# Patient Record
Sex: FEMALE | Race: WHITE | NOT HISPANIC OR LATINO | Employment: FULL TIME | URBAN - METROPOLITAN AREA
[De-identification: names, ages, dates, MRNs, and addresses within clinical notes are randomized per-mention and may not be internally consistent; named-entity substitution may affect disease eponyms.]

---

## 2024-03-26 PROBLEM — E79.0 ELEVATED BLOOD URIC ACID LEVEL: Status: ACTIVE | Noted: 2024-03-26

## 2024-03-26 PROBLEM — I42.8: Status: ACTIVE | Noted: 2020-08-21

## 2024-03-26 PROBLEM — E55.9 VITAMIN D DEFICIENCY: Status: ACTIVE | Noted: 2024-03-26

## 2024-03-26 PROBLEM — R35.0 INCREASED FREQUENCY OF URINATION: Status: ACTIVE | Noted: 2020-08-21

## 2024-03-26 PROBLEM — O20.0 THREATENED MISCARRIAGE (HHS-HCC): Status: ACTIVE | Noted: 2020-08-21

## 2024-03-26 PROBLEM — R00.2 PALPITATIONS: Status: ACTIVE | Noted: 2020-08-21

## 2024-03-26 PROBLEM — I10 HYPERTENSION: Status: ACTIVE | Noted: 2020-08-21

## 2024-03-26 PROBLEM — E78.5 HYPERLIPIDEMIA: Status: ACTIVE | Noted: 2024-03-26

## 2024-03-26 PROBLEM — N92.0 MENORRHAGIA: Status: ACTIVE | Noted: 2024-03-26

## 2024-03-26 PROBLEM — M54.12 RADICULOPATHY, CERVICAL REGION: Status: ACTIVE | Noted: 2020-09-02

## 2024-03-26 PROBLEM — R74.8 ELEVATED LIVER ENZYMES: Status: ACTIVE | Noted: 2024-03-26

## 2024-03-26 PROBLEM — Z86.79 HISTORY OF CARDIAC ARRHYTHMIA: Status: ACTIVE | Noted: 2024-03-26

## 2024-03-26 PROBLEM — D50.8 IRON DEFICIENCY ANEMIA SECONDARY TO INADEQUATE DIETARY IRON INTAKE: Status: ACTIVE | Noted: 2020-08-21

## 2024-03-26 PROBLEM — M79.601 PAIN OF RIGHT UPPER EXTREMITY: Status: ACTIVE | Noted: 2020-09-02

## 2024-03-26 PROBLEM — O30.012: Status: ACTIVE | Noted: 2024-03-26

## 2024-03-26 PROBLEM — L21.9 SEBORRHEIC DERMATITIS: Status: ACTIVE | Noted: 2024-03-26

## 2024-03-26 PROBLEM — M79.643 HAND PAIN: Status: ACTIVE | Noted: 2024-03-26

## 2024-03-26 PROBLEM — O99.019 ANTEPARTUM ANEMIA (HHS-HCC): Status: ACTIVE | Noted: 2020-08-21

## 2024-03-26 PROBLEM — O09.519 ELDERLY PRIMIGRAVIDA (HHS-HCC): Status: ACTIVE | Noted: 2020-08-21

## 2024-03-27 ENCOUNTER — OFFICE VISIT (OUTPATIENT)
Dept: CARDIOLOGY | Facility: CLINIC | Age: 39
End: 2024-03-27
Payer: COMMERCIAL

## 2024-03-27 VITALS
HEIGHT: 60 IN | WEIGHT: 132.4 LBS | BODY MASS INDEX: 26 KG/M2 | OXYGEN SATURATION: 99 % | SYSTOLIC BLOOD PRESSURE: 98 MMHG | DIASTOLIC BLOOD PRESSURE: 72 MMHG | HEART RATE: 72 BPM

## 2024-03-27 DIAGNOSIS — I42.8 ARRHYTHMOGENIC RIGHT VENTRICULAR DYSPLASIA (MULTI): Primary | ICD-10-CM

## 2024-03-27 PROCEDURE — 3074F SYST BP LT 130 MM HG: CPT | Performed by: INTERNAL MEDICINE

## 2024-03-27 PROCEDURE — 99213 OFFICE O/P EST LOW 20 MIN: CPT | Performed by: INTERNAL MEDICINE

## 2024-03-27 PROCEDURE — 1036F TOBACCO NON-USER: CPT | Performed by: INTERNAL MEDICINE

## 2024-03-27 PROCEDURE — 3078F DIAST BP <80 MM HG: CPT | Performed by: INTERNAL MEDICINE

## 2024-03-27 RX ORDER — NORETHINDRONE ACETATE AND ETHINYL ESTRADIOL 1MG-20(21)
1 KIT ORAL DAILY
COMMUNITY
Start: 2024-03-02

## 2024-03-27 ASSESSMENT — ENCOUNTER SYMPTOMS: OCCASIONAL FEELINGS OF UNSTEADINESS: 0

## 2024-03-27 ASSESSMENT — COLUMBIA-SUICIDE SEVERITY RATING SCALE - C-SSRS
1. IN THE PAST MONTH, HAVE YOU WISHED YOU WERE DEAD OR WISHED YOU COULD GO TO SLEEP AND NOT WAKE UP?: NO
2. HAVE YOU ACTUALLY HAD ANY THOUGHTS OF KILLING YOURSELF?: NO
6. HAVE YOU EVER DONE ANYTHING, STARTED TO DO ANYTHING, OR PREPARED TO DO ANYTHING TO END YOUR LIFE?: NO

## 2024-03-27 ASSESSMENT — PATIENT HEALTH QUESTIONNAIRE - PHQ9
2. FEELING DOWN, DEPRESSED OR HOPELESS: SEVERAL DAYS
10. IF YOU CHECKED OFF ANY PROBLEMS, HOW DIFFICULT HAVE THESE PROBLEMS MADE IT FOR YOU TO DO YOUR WORK, TAKE CARE OF THINGS AT HOME, OR GET ALONG WITH OTHER PEOPLE: NOT DIFFICULT AT ALL
1. LITTLE INTEREST OR PLEASURE IN DOING THINGS: SEVERAL DAYS
SUM OF ALL RESPONSES TO PHQ9 QUESTIONS 1 AND 2: 2

## 2024-03-27 ASSESSMENT — PAIN SCALES - GENERAL: PAINLEVEL: 1

## 2024-03-27 NOTE — PROGRESS NOTES
Primary Care Physician: LAVERNE Pineda  Date of Visit: 03/27/2024  2:00 PM EDT  Location of visit: [facility]     Chief Complaint:   Chief Complaint   Patient presents with    Palpitations    Hyperlipidemia    Hypertension    Follow-up     1 year follow up     HPI / Summary:   Amelia Singh is a 38 y.o. female presents for follow-up    ROS    Medical History:   She has a past medical history of Encounter for immunization (06/06/2019), Personal history of other diseases of the circulatory system (12/19/2018), and Personal history of other drug therapy (01/02/2019).  Surgical Hx:   She has a past surgical history that includes Other surgical history (12/19/2018) and Other surgical history (08/29/2019).   Social Hx:   She reports that she has never smoked. She has never used smokeless tobacco. She reports current alcohol use. She reports that she does not use drugs.  Family Hx:   Her family history is not on file.   Allergies:  Allergies   Allergen Reactions    Neomycin Other     Burning from the ear drops    Neomycin-Hydrocortisone Other     Burning from the ear drops     Outpatient Medications:  Current Outpatient Medications   Medication Instructions    Blisovi Fe 1/20, 28, 1 mg-20 mcg (21)/75 mg (7) tablet 1 tablet, oral, Daily     Physical Exam:  Vitals:    03/27/24 1402   BP: 118/80   BP Location: Right arm   Patient Position: Sitting   BP Cuff Size: Adult   Pulse: 74   SpO2: 99%   Weight: 60.1 kg (132 lb 6.4 oz)   Height: 1.524 m (5')     Wt Readings from Last 5 Encounters:   03/27/24 60.1 kg (132 lb 6.4 oz)   07/31/23 57.2 kg (126 lb)   03/22/23 56.5 kg (124 lb 8 oz)   09/08/22 56.7 kg (125 lb)   08/24/22 56.7 kg (125 lb)     Physical Exam  JVP not elevated. Carotid impulses are 2+ without overlying bruit.   Chest exhibits fair to good air movement with completely clear breath sounds.   The cardiac rhythm is regular with no premature beats.   Normal S1 and S2. No gallop, murmur or  rub, or click.   Abdomen is soft and benign without focal tenderness.   With no lower leg edema. The pedal pulses are intact.     Last Labs:  No visits with results within 6 Month(s) from this visit.   Latest known visit with results is:   Legacy Encounter on 07/31/2023   Component Date Value    Pathology Report 07/31/2023                      Value:    Accession #: [id number]     Date of Procedure:  7/31/2023       Pathologist: [facility], Cytology  Date Reported: 8/8/2023  Date Received:  7/31/2023  Submitting Physician: LAURIE SAGE MD                    FINAL CYTOLOGICAL INTERPRETATION        A.  THINPREP PAP CERVICAL:              Specimen adequacy:       SATISFACTORY FOR EVALUATION.       Quality Indicator: Endocervical/transformation zone component is present.              General Categorization:       NEGATIVE FOR INTRAEPITHELIAL LESION OR MALIGNANCY.                            HIGH RISK HPV TEST RESULT:                       HPV GENOTYPE  16                      NEGATIVE       HPV GENOTYPE  18                      NEGATIVE       HPV GENOTYPE  OTHER             NEGATIVE              Reference Range: Negative                  Slide(s) initially screened by a Cytotechnologist at [facility], [address] [place] [id number]    QC review performed a                          t [facility], [address] [place] [address]    Testing for high-risk (HR) type of human papilloma virus (HPV) is performed by  the Roche lorraine HPV Test.  The lorraine HPV Test is a qualitative polymerase chain  reaction that amplifies DNA of HPV16, HPV18 and 12 other high-risk HPV types  (31, 33, 35, 39, 45, 51, 52, 56, 58, 59, 66, and 68) associated with cervical  cancer and its precursor lesions.  A positive result indicates the presence of  HPV DNA due to one or more of the 14 genotypes: 16, 18, 31, 33, 35, 39, 45, 51,  52, 56, 58, 59, 66,  and 68. Negative results indicate HPV DNA concentrations  are undetectable or below the pre-set threshold for detection. False negative  results may be associated with unoptimized sampling. A negative HR HPV result  does not exclude the possibility of future cytologic HSIL or underlying CIN2-3  or cancer.    This test is approved for cervical specimens by  the US Food and Drug  Administration. Results of this test should be                           interpreted in conjunction with  the patient's Pap test results.  Please refer to ASCCP current guidelines for  the use of HPV DNA testing, result interpretation, and patient management.   The performance of this test was verified by the Molecular Diagnostic  Laboratory at [facility]. The lab is  certified under the Clinical Laboratory Amendments of 1988 (CLIA 88) as  qualified to perform high complexity clinical laboratory testing.    This specimen has been analyzed by the [employer] HealthPrep Imaging System ([employer]  an automated imaging and review system, which assists the laboratory in  evaluating cells on ThinPrep Pap tests. Following automated imaging, selected  fields from every slide were reviewed by a cytotechnologist and/or pathologist.      Electronically Signed Out By [facility],  Cytology//LSM/SLD   By the signature on this report, the individual or group listed as making the  Final Interpretation/Diagnosis certifies th                          at they have reviewed this case.  Diagnostic interpretation performed at [facility] [address] [address]. [facility] [address]  Educational Note:  Cervical cytology is a screening procedure primarily for squamous cancers and  precursors and has associated false-negative and false-positive results as  evidenced by published data.  Your patient's test should be interpreted in this  context, together with patient's history and clinical findings.   Regular  sampling and follow-up of unexplained clinical signs and symptoms are  recommended to minimize false negative results.         Clinical History  Date of Last Menstrual Period:     N/A    Other Clinical Conditions:  COTEST HPV(Genotype) except for ASC-H, HSIL, Carcinoma - Include HPV Genotype  testing    Clinical Diagnosis History: Encounter for routine gynecological examination -  (Z01.419)   Source of Specimen  A: THINPREP PAP CERVICAL            [facility]  Department of Pathology   [address] [place] [address]        CONVERTED FINAL DIAGNOSIS 07/31/2023                      Value:A.  THINPREP PAP CERVICAL:              Specimen adequacy:       SATISFACTORY FOR EVALUATION.       Quality Indicator: Endocervical/transformation zone component is present.              General Categorization:       NEGATIVE FOR INTRAEPITHELIAL LESION OR MALIGNANCY.                            HIGH RISK HPV TEST RESULT:                       HPV GENOTYPE  16                      NEGATIVE       HPV GENOTYPE  18                      NEGATIVE       HPV GENOTYPE  OTHER             NEGATIVE              Reference Range: Negative                    CONVERTED CLINICAL DIAGN* 07/31/2023                      Value:Clinical Diagnosis History: Encounter for routine gynecological examination -  (Z01.419)      CONVERTED DIAGNOSIS COMM* 07/31/2023                      Value:Slide(s) initially screened by a Cytotechnologist at [facility], [address] [place] [id number]    QC review performed at [facility], [address] [place] [address]    Testing for high-risk (HR) type of human papilloma virus (HPV) is performed by  the Roche lorraine HPV Test.  The lorraine HPV Test is a qualitative polymerase chain  reaction that amplifies DNA of HPV16, HPV18 and 12 other high-risk HPV types  (31, 33, 35, 39, 45, 51,  52, 56, 58, 59, 66, and 68) associated with cervical  cancer and its precursor lesions.  A positive result indicates the presence of  HPV DNA due to one or more of the 14 genotypes: 16, 18, 31, 33, 35, 39, 45, 51,  52, 56, 58, 59, 66, and 68. Negative results indicate HPV DNA concentrations  are undetectable or below the pre-set threshold for detection. False negative  results may be associated with unoptimized sampling. A negative HR HPV result  does not exclude the possibility of future                           cytologic HSIL or underlying CIN2-3  or cancer.    This test is approved for cervical specimens by  the US Food and Drug  Administration. Results of this test should be interpreted in conjunction with  the patient's Pap test results.  Please refer to ASCCP current guidelines for  the use of HPV DNA testing, result interpretation, and patient management.   The performance of this test was verified by the Molecular Diagnostic  Laboratory at [facility]. The lab is  certified under the Clinical Laboratory Amendments of 1988 (CLIA 88) as  qualified to perform high complexity clinical laboratory testing.    This specimen has been analyzed by the [employer] Imaging System ([employer]  an automated imaging and review system, which assists the laboratory in  evaluating cells on ThinPrep Pap tests. Following automated imaging, selected  fields from every slide were reviewed by a cytotechnologist and/or pathologist.        CONVERTED FINAL REPORT P* 07/31/2023 \\copathshare\copath\PDF [id number]_[id number]         Assessment/Plan   Assessment:     1. History of arrhythmogenic right ventricular dysplasia. This patient is a very pleasant 33-year-old white female who in 2004 developed significant menstrual bleeding that required a one unit PRBC transfusion and the initiation of oral contraceptive pills to regulate her menstrual flow. At that time she had an EKG performed which  evidently was abnormal. The patient was living in [facility] at that time. She was referred to a cardiologist to diagnosed arrhythmogenic right ventricular dysplasia. The patient recalls having a cardiac MRI performed along with an exercise stress test and even an electrophysiology study. Ultimately she was told to have a benign situation and no further therapy or investigation was pursued. She was on a brief course of beta-blockade at that time. The patient subsequently moved to [place] in 2006 and was seen by another cardiologist Dr. Moseley at the [facility]. She underwent additional electrophysiology study and possibly even an ablation at that time. She had programmed stimulation performed and evidently she ultimately was thought not to have any inducible ventricular arrhythmias. The patient subsequently moved to [facility] and was seen by Dr. Burks at the [facility]. A repeat electrophysiology study was not performed. Throughout all this time the patient had very minimal symptoms in terms of palpitations. The patient has currently moved to the [facility]. She is very rare fluttering sensation that may be worse when she has lack of sleep or with increased sugar intake. She is now pregnant with twins. EKG LAD system visit was essentially unremarkable other than for slightly short NH interval. The patient is doing well now throughout her pregnancy with a due date of 08/06/2019. She has negligible palpitations and no shortness of breath. Echocardiogram performed during this visit on 05/29/2019 demonstrates normal left ventricular chamber size and systolic function with an LV ejection fraction of 60â€“65 percent. There are no significant valvular abnormalities. The patient denied delivered identical twins in 7/2019 and had a very self-limited episode of preeclampsia. She had no palpitations or arrhythmia during the entire pregnancy. The  patient's pregnancy was uneventful from a cardiac standpoint. The patient's active patient in the [facility] was checked on school in 1 year COVID-19. The patient has had very rare palpitations over the past 1 year typically lasting 5 minutes. There is no particular pattern or trigger to the palpitations. She has had less than 5 them over the last 1 year lasting only gets. She will sit relax and the episodes resolve. She will return in 12 months for routine follow-up visit. At this point we will continue simple observation and will request the outside records from [facility].  Patient continues to do well with only 1 perhaps minimal episode of palpitation barely memorable.  Lab work from 9/21/2022 included a normal CBC and a lipid panel cholesterol 161 LDL 83 HDL 48 triglyceride 149.  Patient currently on no medications other than for oral contraceptives.  No need for beta-blockade at this time.  Return to office in 1 year for routine follow-up.           Orders:  No orders of the defined types were placed in this encounter.     Followup Appts:  Future Appointments   Date Time Provider Department Center   5/29/2024  1:50 PM Rebeca Betts, ELIAN-CNP [id number] [facility]           ____________________________________________________________  Saadia Mclean RN  [facility]  [facility]

## 2024-05-29 ENCOUNTER — OFFICE VISIT (OUTPATIENT)
Dept: PRIMARY CARE | Facility: CLINIC | Age: 39
End: 2024-05-29
Payer: COMMERCIAL

## 2024-05-29 VITALS
BODY MASS INDEX: 26.39 KG/M2 | HEART RATE: 73 BPM | OXYGEN SATURATION: 99 % | WEIGHT: 134.4 LBS | RESPIRATION RATE: 12 BRPM | SYSTOLIC BLOOD PRESSURE: 108 MMHG | HEIGHT: 60 IN | TEMPERATURE: 97.9 F | DIASTOLIC BLOOD PRESSURE: 60 MMHG

## 2024-05-29 DIAGNOSIS — G43.809 OTHER MIGRAINE WITHOUT STATUS MIGRAINOSUS, NOT INTRACTABLE: ICD-10-CM

## 2024-05-29 DIAGNOSIS — J30.1 SEASONAL ALLERGIC RHINITIS DUE TO POLLEN: ICD-10-CM

## 2024-05-29 DIAGNOSIS — Z13.220 LIPID SCREENING: ICD-10-CM

## 2024-05-29 DIAGNOSIS — Z13.0 SCREENING FOR DEFICIENCY ANEMIA: ICD-10-CM

## 2024-05-29 DIAGNOSIS — E55.9 VITAMIN D DEFICIENCY: ICD-10-CM

## 2024-05-29 DIAGNOSIS — R20.2 TINGLING SENSATION: ICD-10-CM

## 2024-05-29 DIAGNOSIS — Z00.00 ENCOUNTER FOR ROUTINE HISTORY AND PHYSICAL EXAMINATION OF ADULT: Primary | ICD-10-CM

## 2024-05-29 DIAGNOSIS — Z13.1 DIABETES MELLITUS SCREENING: ICD-10-CM

## 2024-05-29 PROCEDURE — 3078F DIAST BP <80 MM HG: CPT | Performed by: NURSE PRACTITIONER

## 2024-05-29 PROCEDURE — 99214 OFFICE O/P EST MOD 30 MIN: CPT | Performed by: NURSE PRACTITIONER

## 2024-05-29 PROCEDURE — 99396 PREV VISIT EST AGE 40-64: CPT | Performed by: NURSE PRACTITIONER

## 2024-05-29 PROCEDURE — 3074F SYST BP LT 130 MM HG: CPT | Performed by: NURSE PRACTITIONER

## 2024-05-29 RX ORDER — TOPIRAMATE 50 MG/1
TABLET, FILM COATED ORAL
Qty: 60 TABLET | Refills: 5 | Status: SHIPPED | OUTPATIENT
Start: 2024-05-29

## 2024-05-29 ASSESSMENT — PATIENT HEALTH QUESTIONNAIRE - PHQ9
SUM OF ALL RESPONSES TO PHQ9 QUESTIONS 1 AND 2: 0
1. LITTLE INTEREST OR PLEASURE IN DOING THINGS: NOT AT ALL
2. FEELING DOWN, DEPRESSED OR HOPELESS: NOT AT ALL

## 2024-05-29 ASSESSMENT — PROMIS GLOBAL HEALTH SCALE
RATE_GENERAL_HEALTH: GOOD
RATE_AVERAGE_PAIN: 0
RATE_MENTAL_HEALTH: GOOD
EMOTIONAL_PROBLEMS: OFTEN
RATE_SOCIAL_SATISFACTION: GOOD
CARRYOUT_PHYSICAL_ACTIVITIES: COMPLETELY
CARRYOUT_SOCIAL_ACTIVITIES: GOOD
RATE_QUALITY_OF_LIFE: GOOD
RATE_AVERAGE_FATIGUE: MODERATE
RATE_PHYSICAL_HEALTH: GOOD

## 2024-05-29 ASSESSMENT — ENCOUNTER SYMPTOMS
RESPIRATORY NEGATIVE: 1
GASTROINTESTINAL NEGATIVE: 1
SINUS PRESSURE: 1

## 2024-05-29 NOTE — PATIENT INSTRUCTIONS
With the beginning of carpal tunnel symptoms usually one uses a wrist support and sometimes a sleeve   Carpal tunnel symptoms are progressive  In ortho one can get a injection in the wrist   After the age of 40 we start to see over use syndrome   Migraines shows that you have a sensitive brain    Lactobacillus is the important gut bacteria for the small intestine and bifiderobacterium is for the large intestine   Try to have less sugar in the diet   Get a probiotic with 50,000 of both   Go to a breast center either at [facility] at the new [facility] pavilion on the first floor

## 2024-05-29 NOTE — PROGRESS NOTES
Subjective   Patient ID: Amelia Singh is a 39 y.o. female who presents for Annual Exam (Physical- no concerns).    HPI   The paient has had the right index finger burning   She can bend it and can can  she is right handed   She does a lot of typing at work   She has noticed the burning for a few months starting in the  beginning of the day   She sometimes cannot  as tightly as much as she usually can  She has had a right shoulder injury she was carrying some heavy things for a while did pt and massage  The finger does not lock   She has shoulder tension     She has allergies during the spring and usually takes an otc medication         Review of Systems   HENT:  Positive for sinus pressure and sneezing.    Respiratory: Negative.     Gastrointestinal: Negative.    Genitourinary: Negative.    Allergic/Immunologic: Positive for environmental allergies.   Neurological:         Gets migraines and uses excedrine migraine gets them mid cycle   She    In the spring and fall eyes get itchy no asthma  She wears glasses   She sees a cardiologist for an arrythmia  for arvd   She has a strong family hx of all the cancers  Her father had prostate   Her uncles had  Her paternal uncles had  colon cancer   She has had great grand mother with ovarian cancer   Great grandmother  with thymus   Objective   /60 (Patient Position: Sitting)   Pulse 73   Temp 36.6 °C (97.9 °F)   Resp 12   Ht 1.524 m (5')   Wt 61 kg (134 lb 6.4 oz)   SpO2 99%   BMI 26.25 kg/m²     Physical Exam  Vitals and nursing note reviewed.   Constitutional:       Appearance: Normal appearance.   HENT:      Head: Normocephalic and atraumatic.      Right Ear: Tympanic membrane normal.      Left Ear: Tympanic membrane normal.      Nose: Congestion present.      Mouth/Throat:      Mouth: Mucous membranes are moist.   Eyes:      Extraocular Movements: Extraocular movements intact.      Conjunctiva/sclera: Conjunctivae normal.   Cardiovascular:       Rate and Rhythm: Normal rate and regular rhythm.      Pulses: Normal pulses.      Heart sounds: Normal heart sounds.   Pulmonary:      Effort: Pulmonary effort is normal.      Breath sounds: Normal breath sounds.   Abdominal:      General: Abdomen is flat. Bowel sounds are normal.      Palpations: Abdomen is soft.   Musculoskeletal:         General: Normal range of motion.      Cervical back: Normal range of motion and neck supple.   Skin:     General: Skin is warm and dry.   Neurological:      General: No focal deficit present.      Mental Status: She is alert and oriented to person, place, and time. Mental status is at baseline.   Psychiatric:         Mood and Affect: Mood normal.         Behavior: Behavior normal.         Thought Content: Thought content normal.         Judgment: Judgment normal.         Assessment/Plan   Problem List Items Addressed This Visit             ICD-10-CM    Vitamin D deficiency E55.9    Relevant Orders    Vitamin D 25-Hydroxy,Total (for eval of Vitamin D levels) (Completed)     Other Visit Diagnoses         Codes    Encounter for routine history and physical examination of adult    -  Primary Z00.00    Lipid screening     Z13.220    Relevant Orders    Lipid panel (Completed)    Diabetes mellitus screening     Z13.1    Relevant Orders    Comprehensive Metabolic Panel (Completed)    Screening for deficiency anemia     Z13.0    Relevant Orders    CBC (Completed)    Seasonal allergic rhinitis due to pollen     J30.1    Tingling sensation     R20.2    Other migraine without status migrainosus, not intractable     G43.809    Relevant Medications    topiramate (Topamax) 50 mg tablet

## 2024-06-05 ENCOUNTER — LAB (OUTPATIENT)
Dept: LAB | Facility: LAB | Age: 39
End: 2024-06-05
Payer: COMMERCIAL

## 2024-06-05 DIAGNOSIS — E55.9 VITAMIN D DEFICIENCY: ICD-10-CM

## 2024-06-05 DIAGNOSIS — Z13.220 LIPID SCREENING: ICD-10-CM

## 2024-06-05 DIAGNOSIS — Z13.0 SCREENING FOR DEFICIENCY ANEMIA: ICD-10-CM

## 2024-06-05 DIAGNOSIS — Z13.1 DIABETES MELLITUS SCREENING: ICD-10-CM

## 2024-06-05 LAB
25(OH)D3 SERPL-MCNC: 26 NG/ML (ref 30–100)
ALBUMIN SERPL BCP-MCNC: 4.5 G/DL (ref 3.4–5)
ALP SERPL-CCNC: 50 U/L (ref 33–110)
ALT SERPL W P-5'-P-CCNC: 7 U/L (ref 7–45)
ANION GAP SERPL CALC-SCNC: 13 MMOL/L (ref 10–20)
AST SERPL W P-5'-P-CCNC: 12 U/L (ref 9–39)
BILIRUB SERPL-MCNC: 1 MG/DL (ref 0–1.2)
BUN SERPL-MCNC: 13 MG/DL (ref 6–23)
CALCIUM SERPL-MCNC: 9.5 MG/DL (ref 8.6–10.6)
CHLORIDE SERPL-SCNC: 105 MMOL/L (ref 98–107)
CHOLEST SERPL-MCNC: 219 MG/DL (ref 0–199)
CHOLESTEROL/HDL RATIO: 4.5
CO2 SERPL-SCNC: 25 MMOL/L (ref 21–32)
CREAT SERPL-MCNC: 0.83 MG/DL (ref 0.5–1.05)
EGFRCR SERPLBLD CKD-EPI 2021: >90 ML/MIN/1.73M*2
ERYTHROCYTE [DISTWIDTH] IN BLOOD BY AUTOMATED COUNT: 13.2 % (ref 11.5–14.5)
GLUCOSE SERPL-MCNC: 85 MG/DL (ref 74–99)
HCT VFR BLD AUTO: 40.4 % (ref 36–46)
HDLC SERPL-MCNC: 49 MG/DL
HGB BLD-MCNC: 13.5 G/DL (ref 12–16)
LDLC SERPL CALC-MCNC: 140 MG/DL
MCH RBC QN AUTO: 29.4 PG (ref 26–34)
MCHC RBC AUTO-ENTMCNC: 33.4 G/DL (ref 32–36)
MCV RBC AUTO: 88 FL (ref 80–100)
NON HDL CHOLESTEROL: 170 MG/DL (ref 0–149)
NRBC BLD-RTO: 0 /100 WBCS (ref 0–0)
PLATELET # BLD AUTO: 387 X10*3/UL (ref 150–450)
POTASSIUM SERPL-SCNC: 4.2 MMOL/L (ref 3.5–5.3)
PROT SERPL-MCNC: 7.1 G/DL (ref 6.4–8.2)
RBC # BLD AUTO: 4.59 X10*6/UL (ref 4–5.2)
SODIUM SERPL-SCNC: 139 MMOL/L (ref 136–145)
TRIGL SERPL-MCNC: 151 MG/DL (ref 0–149)
VLDL: 30 MG/DL (ref 0–40)
WBC # BLD AUTO: 8.8 X10*3/UL (ref 4.4–11.3)

## 2024-06-05 PROCEDURE — 80061 LIPID PANEL: CPT

## 2024-06-05 PROCEDURE — 36415 COLL VENOUS BLD VENIPUNCTURE: CPT

## 2024-06-05 PROCEDURE — 85027 COMPLETE CBC AUTOMATED: CPT

## 2024-06-05 PROCEDURE — 80053 COMPREHEN METABOLIC PANEL: CPT

## 2024-06-05 PROCEDURE — 82306 VITAMIN D 25 HYDROXY: CPT

## 2024-06-11 RX ORDER — ERGOCALCIFEROL 1.25 MG/1
CAPSULE ORAL
Qty: 6 CAPSULE | Refills: 0 | Status: SHIPPED | OUTPATIENT
Start: 2024-06-16

## 2024-07-21 ENCOUNTER — PATIENT MESSAGE (OUTPATIENT)
Dept: OBSTETRICS AND GYNECOLOGY | Facility: CLINIC | Age: 39
End: 2024-07-21
Payer: COMMERCIAL

## 2024-07-21 DIAGNOSIS — Z30.41 ENCOUNTER FOR SURVEILLANCE OF CONTRACEPTIVE PILLS: ICD-10-CM

## 2024-07-22 RX ORDER — NORETHINDRONE ACETATE AND ETHINYL ESTRADIOL 1MG-20(21)
1 KIT ORAL DAILY
Qty: 28 TABLET | Refills: 1 | Status: SHIPPED | OUTPATIENT
Start: 2024-07-22 | End: 2024-09-20

## 2024-09-05 ENCOUNTER — APPOINTMENT (OUTPATIENT)
Dept: OBSTETRICS AND GYNECOLOGY | Facility: CLINIC | Age: 39
End: 2024-09-05
Payer: COMMERCIAL

## 2024-09-05 VITALS
HEIGHT: 60 IN | BODY MASS INDEX: 25.52 KG/M2 | SYSTOLIC BLOOD PRESSURE: 116 MMHG | DIASTOLIC BLOOD PRESSURE: 62 MMHG | WEIGHT: 130 LBS

## 2024-09-05 DIAGNOSIS — Z30.41 ENCOUNTER FOR SURVEILLANCE OF CONTRACEPTIVE PILLS: ICD-10-CM

## 2024-09-05 DIAGNOSIS — Z12.31 ENCOUNTER FOR SCREENING MAMMOGRAM FOR MALIGNANT NEOPLASM OF BREAST: ICD-10-CM

## 2024-09-05 DIAGNOSIS — Z01.419 WELL WOMAN EXAM WITH ROUTINE GYNECOLOGICAL EXAM: Primary | ICD-10-CM

## 2024-09-05 PROBLEM — O30.012: Status: RESOLVED | Noted: 2024-03-26 | Resolved: 2024-09-05

## 2024-09-05 PROBLEM — R35.0 INCREASED FREQUENCY OF URINATION: Status: RESOLVED | Noted: 2020-08-21 | Resolved: 2024-09-05

## 2024-09-05 PROBLEM — O09.519 ELDERLY PRIMIGRAVIDA (HHS-HCC): Status: RESOLVED | Noted: 2020-08-21 | Resolved: 2024-09-05

## 2024-09-05 PROBLEM — O99.019 ANTEPARTUM ANEMIA (HHS-HCC): Status: RESOLVED | Noted: 2020-08-21 | Resolved: 2024-09-05

## 2024-09-05 PROBLEM — O20.0 THREATENED MISCARRIAGE (HHS-HCC): Status: RESOLVED | Noted: 2020-08-21 | Resolved: 2024-09-05

## 2024-09-05 PROCEDURE — 99396 PREV VISIT EST AGE 40-64: CPT | Performed by: OBSTETRICS & GYNECOLOGY

## 2024-09-05 PROCEDURE — 3078F DIAST BP <80 MM HG: CPT | Performed by: OBSTETRICS & GYNECOLOGY

## 2024-09-05 PROCEDURE — 3008F BODY MASS INDEX DOCD: CPT | Performed by: OBSTETRICS & GYNECOLOGY

## 2024-09-05 PROCEDURE — 3074F SYST BP LT 130 MM HG: CPT | Performed by: OBSTETRICS & GYNECOLOGY

## 2024-09-05 PROCEDURE — 1036F TOBACCO NON-USER: CPT | Performed by: OBSTETRICS & GYNECOLOGY

## 2024-09-05 RX ORDER — NORETHINDRONE ACETATE AND ETHINYL ESTRADIOL 1MG-20(21)
1 KIT ORAL DAILY
Qty: 84 TABLET | Refills: 3 | Status: SHIPPED | OUTPATIENT
Start: 2024-09-05 | End: 2025-09-05

## 2024-09-05 ASSESSMENT — ENCOUNTER SYMPTOMS
HEMATOLOGIC/LYMPHATIC NEGATIVE: 0
MUSCULOSKELETAL NEGATIVE: 0
ENDOCRINE NEGATIVE: 0
CARDIOVASCULAR NEGATIVE: 0
NEUROLOGICAL NEGATIVE: 0
CONSTITUTIONAL NEGATIVE: 0
EYES NEGATIVE: 0
PSYCHIATRIC NEGATIVE: 0
GASTROINTESTINAL NEGATIVE: 0
ALLERGIC/IMMUNOLOGIC NEGATIVE: 0
RESPIRATORY NEGATIVE: 0

## 2024-09-05 ASSESSMENT — PAIN SCALES - GENERAL: PAINLEVEL: 0-NO PAIN

## 2024-09-05 NOTE — PROGRESS NOTES
Subjective   Patient ID: Amelia Singh is a 39 y.o. female who presents for Annual Exam (Last pap 7/31/23/Needs mammogram).  39 year old for well woman exam.   Pap current. Due to start mammograms.     Review of Systems   All other systems reviewed and are negative.    Objective   Physical Exam  Vitals reviewed.   Constitutional:       Appearance: Normal appearance.   HENT:      Head: Normocephalic and atraumatic.      Right Ear: External ear normal.      Left Ear: External ear normal.      Nose: Nose normal.      Mouth/Throat:      Mouth: Mucous membranes are moist.   Eyes:      Extraocular Movements: Extraocular movements intact.   Neck:      Thyroid: No thyroid mass or thyromegaly.   Cardiovascular:      Rate and Rhythm: Normal rate and regular rhythm.      Heart sounds: Normal heart sounds.   Pulmonary:      Effort: Pulmonary effort is normal.      Breath sounds: Normal breath sounds.   Chest:   Breasts:     Right: Normal.      Left: Normal.   Abdominal:      General: Abdomen is flat. Bowel sounds are normal.      Palpations: Abdomen is soft.      Tenderness: There is no abdominal tenderness. There is no right CVA tenderness or left CVA tenderness.   Genitourinary:     General: Normal vulva.      Exam position: Lithotomy position.      Labia:         Right: No lesion.         Left: No lesion.       Urethra: No prolapse, urethral swelling or urethral lesion.      Vagina: Normal.      Cervix: Normal.      Uterus: Normal.       Adnexa: Right adnexa normal and left adnexa normal.   Musculoskeletal:         General: Normal range of motion.      Right shoulder: Normal.      Left shoulder: Normal.      Cervical back: Normal, normal range of motion and neck supple.      Thoracic back: Normal.      Lumbar back: Normal.      Right hip: Normal.      Left hip: Normal.      Right upper leg: Normal.      Left upper leg: Normal.      Right knee: Normal.      Left knee: Normal.      Right lower leg: Normal.      Left lower  leg: Normal.   Lymphadenopathy:      Upper Body:      Right upper body: No axillary adenopathy.      Left upper body: No axillary adenopathy.      Lower Body: No right inguinal adenopathy. No left inguinal adenopathy.   Skin:     General: Skin is warm and dry.   Neurological:      General: No focal deficit present.      Mental Status: She is alert and oriented to person, place, and time.      Cranial Nerves: Cranial nerves 2-12 are intact.      Motor: Motor function is intact.   Psychiatric:         Attention and Perception: Attention and perception normal.         Mood and Affect: Mood and affect normal.         Behavior: Behavior normal.         Cognition and Memory: Cognition normal.         Judgment: Judgment normal.     Assessment/Plan   Problem List Items Addressed This Visit    None  Visit Diagnoses         Codes    Well woman exam with routine gynecological exam    -  Primary Z01.419    Encounter for screening mammogram for malignant neoplasm of breast     Z12.31    Relevant Orders    BI mammo bilateral screening tomosynthesis    Encounter for surveillance of contraceptive pills     Z30.41    Relevant Medications    Blisovi Fe 1/20, 28, 1 mg-20 mcg (21)/75 mg (7) tablet          Keyona Nolasco MD 09/05/24 8:33 AM

## 2024-09-16 DIAGNOSIS — E55.9 VITAMIN D DEFICIENCY: ICD-10-CM

## 2024-09-16 RX ORDER — ERGOCALCIFEROL 1.25 MG/1
CAPSULE ORAL
Qty: 6 CAPSULE | Refills: 2 | Status: SHIPPED | OUTPATIENT
Start: 2024-09-16

## 2024-09-20 ENCOUNTER — HOSPITAL ENCOUNTER (OUTPATIENT)
Dept: RADIOLOGY | Facility: CLINIC | Age: 39
Discharge: HOME | End: 2024-09-20
Payer: COMMERCIAL

## 2024-09-20 VITALS — HEIGHT: 60 IN | WEIGHT: 130 LBS | BODY MASS INDEX: 25.52 KG/M2

## 2024-09-20 DIAGNOSIS — Z12.31 ENCOUNTER FOR SCREENING MAMMOGRAM FOR MALIGNANT NEOPLASM OF BREAST: ICD-10-CM

## 2024-09-20 PROCEDURE — 77067 SCR MAMMO BI INCL CAD: CPT

## 2025-04-02 ENCOUNTER — OFFICE VISIT (OUTPATIENT)
Dept: URGENT CARE | Age: 40
End: 2025-04-02
Payer: COMMERCIAL

## 2025-04-02 ENCOUNTER — APPOINTMENT (OUTPATIENT)
Dept: URGENT CARE | Age: 40
End: 2025-04-02
Payer: COMMERCIAL

## 2025-04-02 ENCOUNTER — APPOINTMENT (OUTPATIENT)
Dept: CARDIOLOGY | Facility: CLINIC | Age: 40
End: 2025-04-02
Payer: COMMERCIAL

## 2025-04-02 VITALS
DIASTOLIC BLOOD PRESSURE: 83 MMHG | OXYGEN SATURATION: 97 % | TEMPERATURE: 98.3 F | SYSTOLIC BLOOD PRESSURE: 120 MMHG | BODY MASS INDEX: 26.5 KG/M2 | HEIGHT: 60 IN | HEART RATE: 85 BPM | WEIGHT: 135 LBS | RESPIRATION RATE: 18 BRPM

## 2025-04-02 DIAGNOSIS — J32.9 RHINOSINUSITIS: ICD-10-CM

## 2025-04-02 DIAGNOSIS — R05.1 ACUTE COUGH: Primary | ICD-10-CM

## 2025-04-02 LAB
POC CORONAVIRUS SARS-COV-2 PCR: NEGATIVE
POC HUMAN RHINOVIRUS PCR: POSITIVE
POC INFLUENZA A VIRUS PCR: NEGATIVE
POC INFLUENZA B VIRUS PCR: NEGATIVE
POC RESPIRATORY SYNCYTIAL VIRUS PCR: NEGATIVE

## 2025-04-02 PROCEDURE — 87631 RESP VIRUS 3-5 TARGETS: CPT | Performed by: NURSE PRACTITIONER

## 2025-04-02 PROCEDURE — 1036F TOBACCO NON-USER: CPT | Performed by: NURSE PRACTITIONER

## 2025-04-02 PROCEDURE — 3008F BODY MASS INDEX DOCD: CPT | Performed by: NURSE PRACTITIONER

## 2025-04-02 PROCEDURE — 3079F DIAST BP 80-89 MM HG: CPT | Performed by: NURSE PRACTITIONER

## 2025-04-02 PROCEDURE — 3074F SYST BP LT 130 MM HG: CPT | Performed by: NURSE PRACTITIONER

## 2025-04-02 PROCEDURE — 99203 OFFICE O/P NEW LOW 30 MIN: CPT | Performed by: NURSE PRACTITIONER

## 2025-04-02 RX ORDER — GUAIFENESIN 1200 MG/1
1200 TABLET, EXTENDED RELEASE ORAL EVERY 12 HOURS PRN
Qty: 14 TABLET | Refills: 0 | Status: SHIPPED | OUTPATIENT
Start: 2025-04-02 | End: 2025-04-09

## 2025-04-02 RX ORDER — BENZONATATE 200 MG/1
200 CAPSULE ORAL 3 TIMES DAILY PRN
Qty: 30 CAPSULE | Refills: 0 | Status: SHIPPED | OUTPATIENT
Start: 2025-04-02 | End: 2025-04-09

## 2025-04-02 ASSESSMENT — ENCOUNTER SYMPTOMS
RHINORRHEA: 1
SINUS PRESSURE: 1
COUGH: 1

## 2025-04-02 ASSESSMENT — PAIN SCALES - GENERAL: PAINLEVEL_OUTOF10: 3

## 2025-04-02 NOTE — PROGRESS NOTES
Subjective   Patient ID: Amelia Singh is a 39 y.o. female. They present today with a chief complaint of URI (1 day/Congestion, loss of voice, cough, diarrhea, sore throat, facial pressure).    History of Present Illness    URI  Presenting symptoms: congestion, cough and rhinorrhea        Past Medical History  Allergies as of 2025 - Reviewed 2025   Allergen Reaction Noted    Neomycin Other 2012    Neomycin-hydrocortisone Other 2012       (Not in a hospital admission)       Past Medical History:   Diagnosis Date    Anemia 2004    Encounter for immunization 2019    Need for Tdap vaccination    Migraine     Personal history of other diseases of the circulatory system 2018    History of cardiac arrhythmia    Personal history of other drug therapy 2019    History of influenza vaccination       Past Surgical History:   Procedure Laterality Date     SECTION, LOW TRANSVERSE  2019    OTHER SURGICAL HISTORY  2018    Oral surgery    OTHER SURGICAL HISTORY  2019     section low transverse        reports that she has never smoked. She has never used smokeless tobacco. She reports current alcohol use of about 3.0 standard drinks of alcohol per week. She reports that she does not use drugs.    Review of Systems  Review of Systems   HENT:  Positive for congestion, rhinorrhea and sinus pressure.    Respiratory:  Positive for cough.    All other systems reviewed and are negative.                                 Objective    Vitals:    25 1754   BP: 120/83   BP Location: Right arm   Patient Position: Sitting   BP Cuff Size: Adult   Pulse: 85   Resp: 18   Temp: 36.8 °C (98.3 °F)   TempSrc: Oral   SpO2: 97%   Weight: 61.2 kg (135 lb)   Height: 1.524 m (5')     Patient's last menstrual period was 2025 (approximate).    Physical Exam  Vitals and nursing note reviewed.   Constitutional:       Appearance: Normal appearance.   HENT:      Head:  Normocephalic.      Right Ear: Tympanic membrane normal.      Left Ear: Tympanic membrane normal.      Nose: Congestion and rhinorrhea present. Rhinorrhea is clear.      Right Turbinates: Enlarged.      Left Turbinates: Enlarged.      Mouth/Throat:      Mouth: Mucous membranes are dry.      Pharynx: Oropharynx is clear.   Eyes:      Extraocular Movements: Extraocular movements intact.      Pupils: Pupils are equal, round, and reactive to light.   Cardiovascular:      Rate and Rhythm: Normal rate and regular rhythm.      Pulses: Normal pulses.      Heart sounds: Normal heart sounds.   Pulmonary:      Effort: Pulmonary effort is normal.      Breath sounds: Normal breath sounds.   Musculoskeletal:         General: Normal range of motion.      Cervical back: Normal range of motion and neck supple.   Lymphadenopathy:      Cervical: Cervical adenopathy present.   Skin:     General: Skin is warm and dry.   Neurological:      General: No focal deficit present.      Mental Status: She is alert and oriented to person, place, and time.   Psychiatric:         Mood and Affect: Mood normal.         Behavior: Behavior normal.         Procedures    Point of Care Test & Imaging Results from this visit  No results found for this visit on 04/02/25.   Imaging  No results found.    Cardiology, Vascular, and Other Imaging  No other imaging results found for the past 2 days      Diagnostic study results (if any) were reviewed by LAVERNE Erickson.    Assessment/Plan   Allergies, medications, history, and pertinent labs/EKGs/Imaging reviewed by LAVERNE Erickson.     Medical Decision Making  40 y/o F presents with URI (1 day/Congestion, loss of voice, cough, diarrhea, sore throat, facial pressure). Denies fever, chills, dyspnea, dizziness    Rapid spotfire cough- +rhinosinusitis    Pt managing secretions, airway intact. There or no signs of PTA/TA, trismus, retropharyngeal abscess or epiglotitis. No signs of  osteomyelitis, orbital cellulitis or other complications of sinusitis at this point in time. CXR deferred at this time as lungs are CTA and there is no signs of respiratory distress. SpO2 >94% on RA. There is no reported SOB, CP, MENJIVAR, pleuritic pain, fever. Clinical suspicions of pneumonia or other cardiorespiratory catastrophe at this time are low. Pt is ambulating without difficulty showing no signs of hypoxia. Given the pt underlying risk factors, and benign PE but + rhinosinusitis, conservative management is advised to  advised normal saline mist spray TID, flonase daily, antihistamine daily, vit d3/c/zinc/elderberry, probiotics for gut health, and if s/s persist after 48 hours to  f/u PCP  PT IS ALLERGIC TO NEOMYCIN HYDROCORTISONE, SO MEDROL NAT NOT RECOMMENDED UNLESS PHARMACIST agrees to treatment, but at this time it is not required and advised to perform all other interventions initially    Follow up Care: Pt instructed to follow-up with PCP or other appropriate clinician within 24 to 48 hours. Report to ED if there is any development in worsening pain, difficulty swallowing, change in phonation, fever, chills, neck pain, photophobia, headache, neck stiffness, chest pain, abdominal pain, vomiting, syncope, hemoptysis, leg swelling SOB, fever, facial swelling, eye pain, periorbital swelling/erythema, or any new signs or sx.   - Symptomatic treatment with prn analgesia  - Supportive care with fluids and rest  - The patient may also use OTC decongestants prn, OTC cough and cold meds as needed, warm salt water gargles, throat lozenges and/or OTC throat spray as needed, and nasal saline gtts and suction prn.  - Follow up in  1 week  if symptoms persist or sooner if worsening of symptoms  - Pain relievers (tylenol) for relief of headache, body aches, malaise, muscle and joint pain, ear ache  - Rest, increased fluids, frequent hand washing  - RTC if symptoms persist, worsen or proceed to ER for symptoms of  increased SOB, chest pain, resp distress, high fever, pain with breathing, etc.   - The patient voiced understanding and agreed with the plan.      The patient was educated regarding diagnosis, supportive care, OTC and Rx medications. The patient was given the opportunity to ask questions prior to discharge. They verbalized understanding of my discussion of the plans for treatment, expected course, indications to return to  or seek further evaluation in ED, and the need for timely follow up as directed.     Can try Sambucol Black Elderberry Syrup and Extra Vitamin C and Zinc Lozenges (lozenges only if over 3 years of age)      1. **Stay Home**: Individuals who are sick should stay home for at least 24 hours after their fever has subsided without the use of fever-reducing medications.  2. **Avoid Close Contact**: Sick individuals should avoid close contact with others to prevent spreading the virus.  3. **Hygiene Practices**: Frequent handwashing, using hand sanitizers, and covering coughs and sneezes are encouraged to reduce transmission.  4. **Vaccination**: Annual flu vaccines are recommended for most individuals to help prevent illness      Orders and Diagnoses  Diagnoses and all orders for this visit:  Acute cough  -     POCT SPOTFIRE R/ST Panel Mini w/COVID ([facility]) manually resulted  Rhinosinusitis  -     guaiFENesin (Mucinex) 1,200 mg tablet extended release 12hr; Take 1 tablet (1,200 mg) by mouth every 12 hours if needed (cough) for up to 7 days.  -     benzonatate (Tessalon) 200 mg capsule; Take 1 capsule (200 mg) by mouth 3 times a day as needed for cough for up to 7 days. Do not crush or chew.      Medical Admin Record      Patient disposition: Home    Electronically signed by LAVERNE Erickson  6:20 PM

## 2025-04-23 ENCOUNTER — OFFICE VISIT (OUTPATIENT)
Dept: CARDIOLOGY | Facility: CLINIC | Age: 40
End: 2025-04-23
Payer: COMMERCIAL

## 2025-04-23 VITALS
HEIGHT: 60 IN | OXYGEN SATURATION: 100 % | SYSTOLIC BLOOD PRESSURE: 98 MMHG | WEIGHT: 136.7 LBS | BODY MASS INDEX: 26.84 KG/M2 | DIASTOLIC BLOOD PRESSURE: 72 MMHG | HEART RATE: 72 BPM

## 2025-04-23 DIAGNOSIS — I42.8 ARRHYTHMOGENIC RIGHT VENTRICULAR DYSPLASIA (MULTI): Primary | ICD-10-CM

## 2025-04-23 PROCEDURE — 3074F SYST BP LT 130 MM HG: CPT | Performed by: INTERNAL MEDICINE

## 2025-04-23 PROCEDURE — 99213 OFFICE O/P EST LOW 20 MIN: CPT | Performed by: INTERNAL MEDICINE

## 2025-04-23 PROCEDURE — 3078F DIAST BP <80 MM HG: CPT | Performed by: INTERNAL MEDICINE

## 2025-04-23 PROCEDURE — 3008F BODY MASS INDEX DOCD: CPT | Performed by: INTERNAL MEDICINE

## 2025-04-23 PROCEDURE — 1036F TOBACCO NON-USER: CPT | Performed by: INTERNAL MEDICINE

## 2025-04-23 RX ORDER — MAGNESIUM 250 MG
500 TABLET ORAL
COMMUNITY

## 2025-04-23 ASSESSMENT — ENCOUNTER SYMPTOMS
OCCASIONAL FEELINGS OF UNSTEADINESS: 0
LOSS OF SENSATION IN FEET: 0
DEPRESSION: 1

## 2025-04-23 ASSESSMENT — PAIN SCALES - GENERAL: PAINLEVEL_OUTOF10: 0-NO PAIN

## 2025-04-23 NOTE — PROGRESS NOTES
Primary Care Physician: ELIAN Pineda-CNP  Date of Visit: 2025  1:00 PM EDT  Location of visit: [facility]     Chief Complaint:   No chief complaint on file.    HPI / Summary:   Amelia Singh is a 40 y.o. female presents for follow-up    ROS    Medical History:   She has a past medical history of Anemia (), Encounter for immunization (2019), Migraine, Personal history of other diseases of the circulatory system (2018), and Personal history of other drug therapy (2019).  Surgical Hx:   She has a past surgical history that includes Other surgical history (2018); Other surgical history (2019); and  section, low transverse (2019).   Social Hx:   She reports that she has never smoked. She has never used smokeless tobacco. She reports current alcohol use of about 3.0 standard drinks of alcohol per week. She reports that she does not use drugs.  Family Hx:   Her family history includes Anesthesia related problems in her paternal grandfather; Breast cancer in her father's sister; Cancer in her father, father's brother, father's brother, father's sister, maternal grandmother, and paternal grandfather; Depression in her sister; Heart disease in her father; Hypertension in her father and mother; Stroke in her paternal grandmother.   Allergies:  Allergies   Allergen Reactions    Neomycin Other     Burning from the ear drops    Neomycin-Hydrocortisone Other     Burning from the ear drops     Outpatient Medications:  Current Outpatient Medications   Medication Instructions    Blisovi Fe , 28, 1 mg-20 mcg (21)/75 mg (7) tablet 1 tablet, oral, Daily    ergocalciferol (Vitamin D-2) 1.25 MG (07628 UT) capsule Take one capsule every 2 weeks for vitamin d     Physical Exam:  There were no vitals filed for this visit.    Wt Readings from Last 5 Encounters:   25 61.2 kg (135 lb)   24 59 kg (130 lb)   24 59 kg (130 lb)   24 61 kg (134  lb 6.4 oz)   03/27/24 60.1 kg (132 lb 6.4 oz)     Physical Exam  JVP not elevated. Carotid impulses are 2+ without overlying bruit.   Chest exhibits fair to good air movement with completely clear breath sounds.   The cardiac rhythm is regular with no premature beats.   Normal S1 and S2. No gallop, murmur or rub, or click.   Abdomen is soft and benign without focal tenderness.   With no lower leg edema. The pedal pulses are intact.     Last Labs:  Office Visit on 04/02/2025   Component Date Value    POC Sars-Cov-2 PCR 04/02/2025 Negative     POC Respiratory Syncytia* 04/02/2025 Negative     POC Influenza A Virus PCR 04/02/2025 Negative     POC Influenza B Virus PCR 04/02/2025 Negative     POC Human Rhinovirus PCR 04/02/2025 Positive (A)         Assessment/Plan   Assessment:     1. History of arrhythmogenic right ventricular dysplasia. This patient is a very pleasant 33-year-old white female who in 2004 developed significant menstrual bleeding that required a one unit PRBC transfusion and the initiation of oral contraceptive pills to regulate her menstrual flow. At that time she had an EKG performed which evidently was abnormal. The patient was living in [facility] at that time. She was referred to a cardiologist to diagnosed arrhythmogenic right ventricular dysplasia. The patient recalls having a cardiac MRI performed along with an exercise stress test and even an electrophysiology study. Ultimately she was told to have a benign situation and no further therapy or investigation was pursued. She was on a brief course of beta-blockade at that time. The patient subsequently moved to [place] in 2006 and was seen by another cardiologist Dr. Moseley at the [facility]. She underwent additional electrophysiology study and possibly even an ablation at that time. She had programmed stimulation performed and evidently she ultimately was thought not to have any inducible ventricular  arrhythmias. The patient subsequently moved to [facility] and was seen by Dr. Burks at the [facility]. A repeat electrophysiology study was not performed. Throughout all this time the patient had very minimal symptoms in terms of palpitations. The patient has currently moved to the [facility]. She is very rare fluttering sensation that may be worse when she has lack of sleep or with increased sugar intake. She is now pregnant with twins. EKG LAD system visit was essentially unremarkable other than for slightly short HI interval. The patient is doing well now throughout her pregnancy with a due date of 08/06/2019. She has negligible palpitations and no shortness of breath. Echocardiogram performed during this visit on 05/29/2019 demonstrates normal left ventricular chamber size and systolic function with an LV ejection fraction of 60â€“65 percent. There are no significant valvular abnormalities. The patient denied delivered identical twins in 7/2019 and had a very self-limited episode of preeclampsia. She had no palpitations or arrhythmia during the entire pregnancy. The patient's pregnancy was uneventful from a cardiac standpoint. The patient's active patient in the [facility] was checked on school in 1 year COVID-19. The patient has had very rare palpitations over the past 1 year typically lasting 5 minutes. There is no particular pattern or trigger to the palpitations. She has had less than 5 them over the last 1 year lasting only gets. She will sit relax and the episodes resolve. She will return in 12 months for routine follow-up visit. At this point we will continue simple observation and will request the outside records from [facility].  Patient continues to do well with only 1 perhaps minimal episode of palpitation barely memorable.  Lab work from 9/21/2022 included a normal CBC and a lipid panel cholesterol 161 LDL 83 HDL 48 triglyceride 149.   Patient currently on no medications other than for oral contraceptives.  No need for beta-blockade at this time.  Return to office in 1 year for routine follow-up.  At time of office visit 4/23/2025 patient experiencing occasional episodes of palpitations perhaps more often when dehydrated.  Will happen several times per week for several consecutive weeks and then be absent for the next several weeks.  This sensation is of rapid heart action not particularly irregular maximum duration of 20 minutes.  The palpitations are relatively unpredictable.  Lab work from 6/5/2024 included a normal CBC and CMP.  Cholesterol 219  HDL 49 triglyceride 181.  Statin therapy at this point not recommended.  She did have a vitamin D level of 26 on 6/5/2024 currently on 50,000 units every other week but perhaps this could be adjusted to lower daily dose of 2000 units daily.  Will defer to primary care.  If the palpitations become more frequent heart of longer duration would then consider a 2-week external cardiac monitor and possible beta-blocker therapy.           Orders:  No orders of the defined types were placed in this encounter.     Followup Appts:  Future Appointments   Date Time Provider Department Center   4/23/2025  1:00 PM Jorge Ashford MD [id number] [facility]   5/28/2025  9:45 AM Claudia Gore MD [id number] [facility]   10/3/2025  9:15 AM Keyona Nolasco MD [id number] [facility]           ____________________________________________________________  Jorge Ashford MD  [facility]  [facility]

## 2025-05-28 ENCOUNTER — APPOINTMENT (OUTPATIENT)
Dept: PRIMARY CARE | Facility: CLINIC | Age: 40
End: 2025-05-28
Payer: COMMERCIAL

## 2025-05-28 VITALS
DIASTOLIC BLOOD PRESSURE: 82 MMHG | HEIGHT: 60 IN | BODY MASS INDEX: 26.9 KG/M2 | SYSTOLIC BLOOD PRESSURE: 128 MMHG | WEIGHT: 137 LBS | OXYGEN SATURATION: 98 % | HEART RATE: 64 BPM

## 2025-05-28 DIAGNOSIS — Z00.00 ROUTINE GENERAL MEDICAL EXAMINATION AT A HEALTH CARE FACILITY: Primary | ICD-10-CM

## 2025-05-28 PROBLEM — M79.601 PAIN OF RIGHT UPPER EXTREMITY: Status: RESOLVED | Noted: 2020-09-02 | Resolved: 2025-05-28

## 2025-05-28 PROBLEM — F43.21 SITUATIONAL DEPRESSION: Status: ACTIVE | Noted: 2025-05-28

## 2025-05-28 PROBLEM — G43.909 MIGRAINES: Status: ACTIVE | Noted: 2025-05-28

## 2025-05-28 PROBLEM — M79.643 HAND PAIN: Status: RESOLVED | Noted: 2024-03-26 | Resolved: 2025-05-28

## 2025-05-28 ASSESSMENT — ENCOUNTER SYMPTOMS
BRUISES/BLEEDS EASILY: 0
CONSTIPATION: 0
DYSPHORIC MOOD: 0
DIARRHEA: 0
CHILLS: 0
SINUS PRESSURE: 0
LIGHT-HEADEDNESS: 0
FEVER: 0
BACK PAIN: 0
WHEEZING: 0
SEIZURES: 0
NECK STIFFNESS: 0
EYE DISCHARGE: 0
NERVOUS/ANXIOUS: 0
NAUSEA: 0
VOMITING: 0
COUGH: 0
HEADACHES: 0
CHEST TIGHTNESS: 0
SORE THROAT: 0
EYE ITCHING: 0
SLEEP DISTURBANCE: 0
MYALGIAS: 0
ADENOPATHY: 0
FREQUENCY: 0
ABDOMINAL PAIN: 0
HYPERACTIVE: 0
NECK PAIN: 0
PALPITATIONS: 0
HEMATURIA: 0
ABDOMINAL DISTENTION: 0
DIFFICULTY URINATING: 0
APPETITE CHANGE: 0
DIZZINESS: 0
SINUS PAIN: 0
RHINORRHEA: 0
DECREASED CONCENTRATION: 0
COLOR CHANGE: 0
ACTIVITY CHANGE: 0
VOICE CHANGE: 0
WEAKNESS: 0
ARTHRALGIAS: 0
FATIGUE: 0
SHORTNESS OF BREATH: 0
DYSURIA: 0

## 2025-05-28 NOTE — PROGRESS NOTES
Subjective   Patient ID: Amelia Singh is a 40 y.o. female who presents for New Patient Visit and Annual Exam.    Ms. Singh comes in today for a new patient visit as well as for comprehensive physical exam.  She is generally healthy.  She is followed closely by cardiology for an arrhythmia, ARVD.  She has had ablation.  This is well-managed.  She sees gynecology as well as routine visits with her dentist and ophthalmologist.  She denies any specific concerns, just wanted to come in for routine healthcare maintenance and updated lab studies.  She tries to keep up with normal routine exams.  She has twin 5-year-old boys.  She works full-time at a local [employer], also classically trained [employer], playing in the [employer].  She is prepared to update comprehensive lab studies.  Again, she feels generally well, denying any dizziness, chest pain or palpitations, shortness of breath nor cough, nausea, vomiting, nor changes in bowel nor bladder habits.         Review of Systems   Constitutional:  Negative for activity change, appetite change, chills, fatigue and fever.   HENT:  Negative for congestion, ear pain, postnasal drip, rhinorrhea, sinus pressure, sinus pain, sneezing, sore throat, tinnitus and voice change.    Eyes:  Negative for discharge, itching and visual disturbance.   Respiratory:  Negative for cough, chest tightness, shortness of breath and wheezing.    Cardiovascular:  Negative for chest pain, palpitations (History of arrhythmia as per HPI, followed by cardiology) and leg swelling.   Gastrointestinal:  Negative for abdominal distention, abdominal pain, constipation, diarrhea, nausea and vomiting.   Endocrine: Negative for cold intolerance, heat intolerance and polyuria.   Genitourinary:  Negative for difficulty urinating, dysuria, frequency, hematuria, urgency, vaginal bleeding and vaginal discharge.   Musculoskeletal:  Negative for arthralgias, back pain, myalgias, neck pain and neck stiffness.   Skin:   Negative for color change, pallor and rash.   Allergic/Immunologic: Negative for environmental allergies, food allergies and immunocompromised state.   Neurological:  Negative for dizziness, seizures, syncope, weakness, light-headedness and headaches (Intermittent migraines, managed with Excedrin Migraine).   Hematological:  Negative for adenopathy. Does not bruise/bleed easily.   Psychiatric/Behavioral:  Negative for behavioral problems, decreased concentration, dysphoric mood and sleep disturbance. The patient is not nervous/anxious and is not hyperactive.         Situational stressors, managing       Objective   /82   Pulse 64   Ht 1.524 m (5')   Wt 62.1 kg (137 lb)   SpO2 98%   BMI 26.76 kg/m²     Physical Exam  Constitutional:       General: She is not in acute distress.     Appearance: Normal appearance. She is well-developed. She is not ill-appearing.   HENT:      Head: Normocephalic.      Right Ear: Tympanic membrane, ear canal and external ear normal.      Left Ear: Tympanic membrane, ear canal and external ear normal.      Nose: Nose normal.      Mouth/Throat:      Mouth: Mucous membranes are moist.      Pharynx: Oropharynx is clear. No oropharyngeal exudate or posterior oropharyngeal erythema.   Eyes:      General: Lids are normal. No scleral icterus.     Extraocular Movements: Extraocular movements intact.      Conjunctiva/sclera: Conjunctivae normal.      Pupils: Pupils are equal, round, and reactive to light.   Neck:      Vascular: No carotid bruit.   Cardiovascular:      Rate and Rhythm: Normal rate and regular rhythm.      Pulses: Normal pulses.      Heart sounds: No murmur heard.     No gallop.   Pulmonary:      Effort: Pulmonary effort is normal. No respiratory distress.      Breath sounds: No wheezing, rhonchi or rales.   Chest:      Comments: Deferred to gynecology  Abdominal:      General: Bowel sounds are normal. There is no distension.      Palpations: Abdomen is soft. There is no  mass.      Tenderness: There is no abdominal tenderness. There is no guarding or rebound.   Genitourinary:     Comments: Deferred to gynecology  Musculoskeletal:         General: No swelling or signs of injury.      Cervical back: Normal range of motion and neck supple. No tenderness.      Right lower leg: No edema.      Left lower leg: No edema.   Lymphadenopathy:      Cervical: No cervical adenopathy.   Skin:     General: Skin is warm and dry.      Coloration: Skin is not pale.      Findings: No bruising or rash.   Neurological:      General: No focal deficit present.      Mental Status: She is alert and oriented to person, place, and time.      Cranial Nerves: No cranial nerve deficit.      Motor: No weakness.      Coordination: Coordination normal.      Gait: Gait normal.   Psychiatric:         Mood and Affect: Mood normal.         Behavior: Behavior normal.         Thought Content: Thought content normal.         Judgment: Judgment normal.         Assessment/Plan     Full age-appropriate comprehensive physical exam and health care maintenance performed and discussed today.  Routine safety and preventative measures discussed including self breast exam, seatbelt use, no drinking and driving, no texting and driving, abstinence or cessation of tobacco use, routine dental and vision exams, healthy diet and regular exercise.    We will update comprehensive labs and follow-up on results once available.  Mammogram up-to-date, followed by gynecology, continue annual screening.  All other screening will start age-appropriate times in the future.  TDaP up-to-date from 2019.  Recommend annual flu shots and updated COVID boosters.    Happy to see her annually.  She is to contact us with any questions in the interim.

## 2025-05-28 NOTE — PATIENT INSTRUCTIONS
It was a pleasure meeting you in the office today.  We will follow up on all comprehensive blood work once results are available and make any recommendations based on these results as may be indicated.  Please continue with routine gynecologic exams and annual mammograms.  All other screening will start age-appropriate times in the future.  Tetanus vaccine is up-to-date from 2019, recommended every 10 years.  Please reach out if you have any questions.  Otherwise, we are happy to see you annually for your wellness visits.

## 2025-05-29 LAB
25(OH)D3+25(OH)D2 SERPL-MCNC: 57 NG/ML (ref 30–100)
ALBUMIN SERPL-MCNC: 4.6 G/DL (ref 3.6–5.1)
ALP SERPL-CCNC: 55 U/L (ref 31–125)
ALT SERPL-CCNC: 11 U/L (ref 6–29)
ANION GAP SERPL CALCULATED.4IONS-SCNC: 10 MMOL/L (CALC) (ref 7–17)
AST SERPL-CCNC: 13 U/L (ref 10–30)
BASOPHILS # BLD AUTO: 71 CELLS/UL (ref 0–200)
BASOPHILS NFR BLD AUTO: 1 %
BILIRUB SERPL-MCNC: 0.6 MG/DL (ref 0.2–1.2)
BUN SERPL-MCNC: 11 MG/DL (ref 7–25)
CALCIUM SERPL-MCNC: 9.6 MG/DL (ref 8.6–10.2)
CHLORIDE SERPL-SCNC: 105 MMOL/L (ref 98–110)
CHOLEST SERPL-MCNC: 202 MG/DL
CHOLEST/HDLC SERPL: 3.4 (CALC)
CO2 SERPL-SCNC: 24 MMOL/L (ref 20–32)
CREAT SERPL-MCNC: 0.69 MG/DL (ref 0.5–0.99)
EGFRCR SERPLBLD CKD-EPI 2021: 112 ML/MIN/1.73M2
EOSINOPHIL # BLD AUTO: 121 CELLS/UL (ref 15–500)
EOSINOPHIL NFR BLD AUTO: 1.7 %
ERYTHROCYTE [DISTWIDTH] IN BLOOD BY AUTOMATED COUNT: 12.8 % (ref 11–15)
EST. AVERAGE GLUCOSE BLD GHB EST-MCNC: 103 MG/DL
EST. AVERAGE GLUCOSE BLD GHB EST-SCNC: 5.7 MMOL/L
GLUCOSE SERPL-MCNC: 80 MG/DL (ref 65–99)
HBA1C MFR BLD: 5.2 %
HCT VFR BLD AUTO: 42.4 % (ref 35–45)
HDLC SERPL-MCNC: 59 MG/DL
HGB BLD-MCNC: 13.4 G/DL (ref 11.7–15.5)
IRON SATN MFR SERPL: 19 % (CALC) (ref 16–45)
IRON SERPL-MCNC: 81 MCG/DL (ref 40–190)
LDLC SERPL CALC-MCNC: 124 MG/DL (CALC)
LYMPHOCYTES # BLD AUTO: 2038 CELLS/UL (ref 850–3900)
LYMPHOCYTES NFR BLD AUTO: 28.7 %
MAGNESIUM SERPL-MCNC: 1.9 MG/DL (ref 1.5–2.5)
MCH RBC QN AUTO: 29 PG (ref 27–33)
MCHC RBC AUTO-ENTMCNC: 31.6 G/DL (ref 32–36)
MCV RBC AUTO: 91.8 FL (ref 80–100)
MONOCYTES # BLD AUTO: 540 CELLS/UL (ref 200–950)
MONOCYTES NFR BLD AUTO: 7.6 %
NEUTROPHILS # BLD AUTO: 4331 CELLS/UL (ref 1500–7800)
NEUTROPHILS NFR BLD AUTO: 61 %
NONHDLC SERPL-MCNC: 143 MG/DL (CALC)
PLATELET # BLD AUTO: 424 THOUSAND/UL (ref 140–400)
PMV BLD REES-ECKER: 9.4 FL (ref 7.5–12.5)
POTASSIUM SERPL-SCNC: 4.7 MMOL/L (ref 3.5–5.3)
PROT SERPL-MCNC: 7.4 G/DL (ref 6.1–8.1)
RBC # BLD AUTO: 4.62 MILLION/UL (ref 3.8–5.1)
SODIUM SERPL-SCNC: 139 MMOL/L (ref 135–146)
TIBC SERPL-MCNC: 423 MCG/DL (CALC) (ref 250–450)
TRIGL SERPL-MCNC: 89 MG/DL
TSH SERPL-ACNC: 1.48 MIU/L
VIT B12 SERPL-MCNC: 276 PG/ML (ref 200–1100)
WBC # BLD AUTO: 7.1 THOUSAND/UL (ref 3.8–10.8)

## 2025-06-04 ENCOUNTER — APPOINTMENT (OUTPATIENT)
Dept: PRIMARY CARE | Facility: CLINIC | Age: 40
End: 2025-06-04
Payer: COMMERCIAL

## 2025-08-30 DIAGNOSIS — Z30.41 ENCOUNTER FOR SURVEILLANCE OF CONTRACEPTIVE PILLS: ICD-10-CM

## 2025-09-02 RX ORDER — NORETHINDRONE ACETATE AND ETHINYL ESTRADIOL 1MG-20(21)
1 KIT ORAL
Qty: 84 TABLET | Refills: 3 | Status: SHIPPED | OUTPATIENT
Start: 2025-09-02

## 2025-09-11 ENCOUNTER — APPOINTMENT (OUTPATIENT)
Dept: OBSTETRICS AND GYNECOLOGY | Facility: CLINIC | Age: 40
End: 2025-09-11
Payer: COMMERCIAL

## 2025-10-03 ENCOUNTER — APPOINTMENT (OUTPATIENT)
Dept: OBSTETRICS AND GYNECOLOGY | Facility: CLINIC | Age: 40
End: 2025-10-03
Payer: COMMERCIAL

## 2025-10-09 ENCOUNTER — APPOINTMENT (OUTPATIENT)
Dept: OBSTETRICS AND GYNECOLOGY | Facility: CLINIC | Age: 40
End: 2025-10-09
Payer: COMMERCIAL

## 2025-10-10 ENCOUNTER — APPOINTMENT (OUTPATIENT)
Dept: OBSTETRICS AND GYNECOLOGY | Facility: CLINIC | Age: 40
End: 2025-10-10
Payer: COMMERCIAL